# Patient Record
Sex: MALE | Race: WHITE
[De-identification: names, ages, dates, MRNs, and addresses within clinical notes are randomized per-mention and may not be internally consistent; named-entity substitution may affect disease eponyms.]

---

## 2018-05-19 ENCOUNTER — HOSPITAL ENCOUNTER (EMERGENCY)
Dept: HOSPITAL 62 - ER | Age: 58
Discharge: HOME | End: 2018-05-19
Payer: COMMERCIAL

## 2018-05-19 VITALS — SYSTOLIC BLOOD PRESSURE: 174 MMHG | DIASTOLIC BLOOD PRESSURE: 92 MMHG

## 2018-05-19 DIAGNOSIS — S30.0XXA: Primary | ICD-10-CM

## 2018-05-19 DIAGNOSIS — M47.9: ICD-10-CM

## 2018-05-19 DIAGNOSIS — Z88.1: ICD-10-CM
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
## 2018-05-19 DIAGNOSIS — I10: ICD-10-CM

## 2018-05-19 PROCEDURE — 99283 EMERGENCY DEPT VISIT LOW MDM: CPT

## 2018-05-19 PROCEDURE — 72110 X-RAY EXAM L-2 SPINE 4/>VWS: CPT

## 2018-05-19 NOTE — RADIOLOGY REPORT (SQ)
EXAM DESCRIPTION:  L SPINE WHOLE



COMPLETED DATE/TIME:  5/19/2018 12:11 pm



REASON FOR STUDY:  hit lumbar spine



COMPARISON:  None.



NUMBER OF VIEWS:  Five views including obliques.



TECHNIQUE:  AP, lateral, oblique, and sacral radiographic images acquired of the lumbar spine.



LIMITATIONS:  None.



FINDINGS:  MINERALIZATION: Normal.

SEGMENTATION: Normal.  No transitional anatomy.

ALIGNMENT: Normal.

VERTEBRAE: Maintained height.  No fracture or worrisome bone lesion.

DISCS: Multilevel disc space narrowing with osteophytes.

POSTERIOR ELEMENTS: Pedicles and facets are intact.  No pars defect or posterior arch defects. Facet 
arthropathy is present.

HARDWARE: None in the spine.

PARASPINAL SOFT TISSUES: Normal.

PELVIS: Intact as visualized. No fractures or worrisome bone lesions. SI joints intact.

OTHER: No other significant finding.



IMPRESSION:  SPONDYLOSIS WITHOUT BONE LESION OR FRACTURE.



TECHNICAL DOCUMENTATION:  JOB ID:  [id number]

 2011 [employer]- All Rights Reserved



Reading location - IP/workstation name: [email or web address]

## 2018-05-19 NOTE — ER DOCUMENT REPORT
ED General





- General


Chief Complaint: Back Pain


Stated Complaint: BACK PAIN


Time Seen by Provider: 05/19/18 11:10


Notes: 





57-year-old male presents emergency department complaining of an injury to his 

low back.  States he was walking around late last night early this morning and 

he fell and hit his low back on the corner of her bed.  States that it hurt a 

little bit but did not have any swelling initially, states that after he slept 

on her all night long he developed a significant amount of swelling and small 

amount of bleeding to his low back on the left-hand side.  Complains of 

increased pain when he lays flat or he uses the muscles in his back.  Denies 

numbness, tingling, weakness.  Denies using any blood thinners.  The pain is a 3

-4 out of 5.  He did try taking Aleve for this but it did not help.  Patient is 

most concerned by the swelling.


TRAVEL OUTSIDE OF THE [place] IN LAST 30 DAYS: No





- Related Data


Allergies/Adverse Reactions: 


 





doxycycline [From Vibramycin] Allergy (Verified 05/19/18 11:07)


 











Past Medical History





- General


Information source: Patient





- Social History


Smoking Status: Never Smoker


Chew tobacco use (# tins/day): No


Frequency of alcohol use: Occasional


Drug Abuse: None


Family History: Reviewed & Not Pertinent


Patient has suicidal ideation: No


Patient has homicidal ideation: No


Renal/ Medical History: Denies: Hx Peritoneal Dialysis


Musculoskeltal Medical History: Reports Hx Gout





Review of Systems





- Review of Systems


Constitutional: No symptoms reported


Cardiovascular: No symptoms reported


Respiratory: No symptoms reported


Gastrointestinal: No symptoms reported


Musculoskeletal: See HPI, Back pain


Skin: See HPI - Small bleeding and hematoma.


Hematologic/Lymphatic: No symptoms reported.  denies: Blood clots, Easy bleeding

, Easy bruising


Neurological/Psychological: No symptoms reported





Physical Exam





- Vital signs


Vitals: 


 











Temp Pulse Resp BP Pulse Ox


 


 98.0 F   82   16   185/108 H  98 


 


 05/19/18 10:39  05/19/18 10:39  05/19/18 10:39  05/19/18 10:39  05/19/18 10:39











Interpretation: Hypertensive





- General


General appearance: Appears well, Alert


In distress: Mild





- HEENT


Head: Normocephalic, Atraumatic


Eyes: Normal


Pupils: PERRL


Mucous membranes: Moist





- Respiratory


Respiratory status: No respiratory distress





- Cardiovascular


Normal capillary refill: Yes





- Abdominal


Distension: No distension





- Back


Notes: 





Large hematoma to the left side of the low back near the thoracolumbar junction 

going down to the level of the lumbosacral junction, no fluctuance, tender to 

palpation, superficial abrasions.  Patient is able to flex and extend and 

rotate from side to side without difficulty although it does cause pain.  There 

is superficial bruising as well.  Difficult to determine midline bony 

tenderness palpation is the hematoma does cross midline.





Course





- Re-evaluation


Re-evalutation: 





05/19/18 12:22


Patient treated with muscle relaxers and had x-rays performed.  X-rays show 

spondylosis without any acute fracture or dislocation.  Patient counseled on 

using anti-inflammatories, he already has indomethacin, ibuprofen and Aleve at 

home.  Patient is counseled to use 1 of these not all of them.  Will also be 

prescribed Robaxin and knows to use acetaminophen at home as well.  Will use 

ice for the first 24 hours then switch to heat.  Patient will return for any 

new or concerning symptoms including numbness, tingling, weakness.





- Vital Signs


Vital signs: 


 











Temp Pulse Resp BP Pulse Ox


 


 98.0 F   82   16   185/108 H  98 


 


 05/19/18 10:39  05/19/18 10:39  05/19/18 10:39  05/19/18 10:39  05/19/18 10:39














Discharge





- Discharge


Clinical Impression: 


Lumbar muscle hematoma


Qualifiers:


 Encounter type: initial encounter Qualified Code(s): S30.0XXA - Contusion of 

lower back and pelvis, initial encounter





Low back pain


Qualifiers:


 Chronicity: acute Back pain laterality: left Sciatica presence: without 

sciatica Qualified Code(s): M54.5 - Low back pain





Hypertension


Qualifiers:


 Hypertension type: unspecified Qualified Code(s): I10 - Essential (primary) 

hypertension





Condition: Stable


Disposition: HOME, SELF-CARE


Additional Instructions: 


When he fell he developed bleeding in the muscles in her back.  This may get 

bigger for the next 24 hours.  Please use ice on the affected area for 15 

minutes out of every hour for the next 24 hours, you may then switch to heat 

with the same frequency.  This hematoma will take a long time to resolve, up to 

several weeks.  If it gets significantly bigger after the next 24 hours, 

develops a large amount of redness or exquisite tenderness to palpation please 

return to the emergency department.  Please also return if you develop any 

fevers.  I have prescribed Robaxin which is a muscle relaxer.  Please take it 

as directed when needed, you do not have to finish the prescription if your 

pain goes away.  I would like you to rest your back as much as possible.  

Return for numbness, tingling or weakness.  You may take either indomethacin or 

ibuprofen or Aleve, do not take them all at the same time.  You may also take 

acetaminophen as directed on the bottle for pain in addition to the muscle 

relaxer.


Prescriptions: 


Methocarbamol [Robaxin 750 mg Tablet] 750 mg PO ASDIR PRN #40 tablet


 PRN Reason: 


Forms:  Elevated Blood Pressure

## 2018-10-16 ENCOUNTER — HOSPITAL ENCOUNTER (OUTPATIENT)
Dept: HOSPITAL 62 - OD | Age: 58
End: 2018-10-16
Attending: SURGERY
Payer: COMMERCIAL

## 2018-10-16 DIAGNOSIS — L02.212: Primary | ICD-10-CM

## 2018-10-16 LAB
ADD MANUAL DIFF: NO
ALBUMIN SERPL-MCNC: 4.7 G/DL (ref 3.5–5)
ALP SERPL-CCNC: 93 U/L (ref 38–126)
ALT SERPL-CCNC: 101 U/L (ref 21–72)
ANION GAP SERPL CALC-SCNC: 13 MMOL/L (ref 5–19)
AST SERPL-CCNC: 71 U/L (ref 17–59)
BASOPHILS # BLD AUTO: 0 10^3/UL (ref 0–0.2)
BASOPHILS NFR BLD AUTO: 0.5 % (ref 0–2)
BILIRUB DIRECT SERPL-MCNC: 0.4 MG/DL (ref 0–0.4)
BILIRUB SERPL-MCNC: 1.1 MG/DL (ref 0.2–1.3)
BUN SERPL-MCNC: 22 MG/DL (ref 7–20)
CALCIUM: 10.3 MG/DL (ref 8.4–10.2)
CHLORIDE SERPL-SCNC: 104 MMOL/L (ref 98–107)
CO2 SERPL-SCNC: 22 MMOL/L (ref 22–30)
CRP SERPL-MCNC: 7.9 MG/L (ref ?–10)
EOSINOPHIL # BLD AUTO: 0.2 10^3/UL (ref 0–0.6)
EOSINOPHIL NFR BLD AUTO: 2.6 % (ref 0–6)
ERYTHROCYTE [DISTWIDTH] IN BLOOD BY AUTOMATED COUNT: 12.7 % (ref 11.5–14)
ERYTHROCYTE [SEDIMENTATION RATE] IN BLOOD: 11 MM/HR (ref 0–20)
GLUCOSE SERPL-MCNC: 95 MG/DL (ref 75–110)
HCT VFR BLD CALC: 45.3 % (ref 37.9–51)
HGB BLD-MCNC: 16.3 G/DL (ref 13.5–17)
LYMPHOCYTES # BLD AUTO: 2.3 10^3/UL (ref 0.5–4.7)
LYMPHOCYTES NFR BLD AUTO: 34.6 % (ref 13–45)
MCH RBC QN AUTO: 35.1 PG (ref 27–33.4)
MCHC RBC AUTO-ENTMCNC: 36 G/DL (ref 32–36)
MCV RBC AUTO: 98 FL (ref 80–97)
MONOCYTES # BLD AUTO: 0.5 10^3/UL (ref 0.1–1.4)
MONOCYTES NFR BLD AUTO: 7.3 % (ref 3–13)
NEUTROPHILS # BLD AUTO: 3.7 10^3/UL (ref 1.7–8.2)
NEUTS SEG NFR BLD AUTO: 55 % (ref 42–78)
PLATELET # BLD: 164 10^3/UL (ref 150–450)
POTASSIUM SERPL-SCNC: 4.8 MMOL/L (ref 3.6–5)
PROT SERPL-MCNC: 8.1 G/DL (ref 6.3–8.2)
RBC # BLD AUTO: 4.65 10^6/UL (ref 4.35–5.55)
SODIUM SERPL-SCNC: 139.4 MMOL/L (ref 137–145)
TOTAL CELLS COUNTED % (AUTO): 100 %
WBC # BLD AUTO: 6.7 10^3/UL (ref 4–10.5)

## 2018-10-16 PROCEDURE — 80053 COMPREHEN METABOLIC PANEL: CPT

## 2018-10-16 PROCEDURE — 36415 COLL VENOUS BLD VENIPUNCTURE: CPT

## 2018-10-16 PROCEDURE — 85652 RBC SED RATE AUTOMATED: CPT

## 2018-10-16 PROCEDURE — 85025 COMPLETE CBC W/AUTO DIFF WBC: CPT

## 2018-10-16 PROCEDURE — 86140 C-REACTIVE PROTEIN: CPT
